# Patient Record
Sex: MALE | URBAN - METROPOLITAN AREA
[De-identification: names, ages, dates, MRNs, and addresses within clinical notes are randomized per-mention and may not be internally consistent; named-entity substitution may affect disease eponyms.]

---

## 2018-02-12 ENCOUNTER — TELEPHONE (OUTPATIENT)
Dept: RHEUMATOLOGY | Facility: CLINIC | Age: 48
End: 2018-02-12

## 2018-02-12 NOTE — TELEPHONE ENCOUNTER
----- Message from Natan Palma sent at 11/29/2017  7:54 AM CST -----  Regarding: Linear Scleroderma  Hi Stephanie,  Here is Andrade's chart. I gave him a call and left him a voicemail with the clinic phone # to register.    Thanks,  Natan    Referral / Discharge Coordinator

## 2018-02-12 NOTE — TELEPHONE ENCOUNTER
Called patient's Home number on file [phone] (home). Left a message X1 asking patient to call back regarding the referral we received. An intake form needs to be completed over the phone and records are needed from outside facilities. Awaiting a call back from the patient.  Stephanie Cardona CMA  2/12/2018 10:53 AM

## 2018-02-26 NOTE — TELEPHONE ENCOUNTER
Pt returned Stephanie's call.  Pt will be available anytime tomorrow after 330pm.    Farzana Sanchez RN  [facility]

## 2018-02-26 NOTE — TELEPHONE ENCOUNTER
Called patient's Home number on file [phone] (home). Left a message X2 asking patient to call back regarding the referral we received. Awaiting a call back from the patient.  Stephanie Cardona CMA  2/26/2018 3:17 PM

## 2018-02-27 NOTE — TELEPHONE ENCOUNTER
Scleroderma/Myositis Patient Intake Form      Have you been diagnosed with Scleroderma/Myositis/Dermatomyositis? In childhood      Have you seen a Rheumatologist in the past? No, patient was diagnosed by a Dermatologist as a child. Patient does not know the name of the clinic      Who currently manages your care for this issue now? none      Have you ever been diagnosed with a lung condition? no   1.If so what?      Have you ever been diagnosed with a heart condition? no        1. If so what?      What is your most urgent concern at this time? Patient is unsure what he should be doing to take care of this condition    Have you ever had any of the following tests? Where and when?      Upper endoscopy:  no    Chest x-ray: no    High resolution CT scan: no    Pulmonary function test:  no    EKG: patient is unsure    Right heart Cath: no    ECHO cardiogram:  Patient is unsure    Barium swallow:  no    Have you ever seen the following specialists: Where and when?      Pulmonologist: no    Gastroenterologist: no    Cardiologist: no    Dermatologist: no    Patient has not seen anyone for this for 30 years and there are no records outside records to obtain. Chart is ready for review.     Stephanie Cardona [facility]  2/27/2018 3:43 PM

## 2018-03-01 NOTE — TELEPHONE ENCOUNTER
Spoke with Dr. Mcguire regarding this referral who recommends that the patient be seen in Dermatology as this is not a systemic condition. Called patient to let him know this information and verbalized understanding.   Stephanie Cardona CMA  3/1/2018 2:19 PM

## 2020-03-11 ENCOUNTER — HEALTH MAINTENANCE LETTER (OUTPATIENT)
Age: 50
End: 2020-03-11

## 2020-12-27 ENCOUNTER — HEALTH MAINTENANCE LETTER (OUTPATIENT)
Age: 50
End: 2020-12-27

## 2021-04-25 ENCOUNTER — HEALTH MAINTENANCE LETTER (OUTPATIENT)
Age: 51
End: 2021-04-25

## 2021-10-09 ENCOUNTER — HEALTH MAINTENANCE LETTER (OUTPATIENT)
Age: 51
End: 2021-10-09

## 2022-05-21 ENCOUNTER — HEALTH MAINTENANCE LETTER (OUTPATIENT)
Age: 52
End: 2022-05-21

## 2022-09-17 ENCOUNTER — HEALTH MAINTENANCE LETTER (OUTPATIENT)
Age: 52
End: 2022-09-17

## 2023-06-04 ENCOUNTER — HEALTH MAINTENANCE LETTER (OUTPATIENT)
Age: 53
End: 2023-06-04